# Patient Record
Sex: MALE | Employment: STUDENT | URBAN - METROPOLITAN AREA
[De-identification: names, ages, dates, MRNs, and addresses within clinical notes are randomized per-mention and may not be internally consistent; named-entity substitution may affect disease eponyms.]

---

## 2024-04-22 ENCOUNTER — TELEPHONE (OUTPATIENT)
Dept: PEDIATRICS | Facility: CLINIC | Age: 19
End: 2024-04-22
Payer: COMMERCIAL

## 2024-04-22 NOTE — TELEPHONE ENCOUNTER
PT's mom is calling, off and on for several months, Nilo has had hemorrhoids. They bleed occasionally. PT is scheduled for a WCC with you on 04/30 he has not been seen since 2022. PT has not been evaluated as of yet. Mom is aware to seek treatment if sx are severe or bleeding becomes severe.  Advised to increase fluids, eat fiber rich foods. Avoiding straining with Bms. Mom agrees with above advice.

## 2024-04-30 ENCOUNTER — OFFICE VISIT (OUTPATIENT)
Dept: PEDIATRICS | Facility: CLINIC | Age: 19
End: 2024-04-30
Payer: COMMERCIAL

## 2024-04-30 VITALS
BODY MASS INDEX: 23.38 KG/M2 | WEIGHT: 163.31 LBS | HEIGHT: 70 IN | DIASTOLIC BLOOD PRESSURE: 70 MMHG | HEART RATE: 81 BPM | OXYGEN SATURATION: 97 % | SYSTOLIC BLOOD PRESSURE: 120 MMHG

## 2024-04-30 DIAGNOSIS — K92.1: ICD-10-CM

## 2024-04-30 DIAGNOSIS — Z00.00 ROUTINE ADULT HEALTH MAINTENANCE: Primary | ICD-10-CM

## 2024-04-30 PROCEDURE — 90620 MENB-4C VACCINE IM: CPT | Performed by: PEDIATRICS

## 2024-04-30 PROCEDURE — 90472 IMMUNIZATION ADMIN EACH ADD: CPT | Performed by: PEDIATRICS

## 2024-04-30 PROCEDURE — 90715 TDAP VACCINE 7 YRS/> IM: CPT | Performed by: PEDIATRICS

## 2024-04-30 PROCEDURE — 99395 PREV VISIT EST AGE 18-39: CPT | Performed by: PEDIATRICS

## 2024-04-30 PROCEDURE — 1036F TOBACCO NON-USER: CPT | Performed by: PEDIATRICS

## 2024-04-30 PROCEDURE — 90471 IMMUNIZATION ADMIN: CPT | Performed by: PEDIATRICS

## 2024-04-30 PROCEDURE — 92551 PURE TONE HEARING TEST AIR: CPT | Performed by: PEDIATRICS

## 2024-04-30 SDOH — HEALTH STABILITY: MENTAL HEALTH: SMOKING IN HOME: 0

## 2024-04-30 ASSESSMENT — ENCOUNTER SYMPTOMS
SNORING: 0
DIARRHEA: 0
SLEEP DISTURBANCE: 0

## 2024-04-30 NOTE — PROGRESS NOTES
Subjective   History was provided by the  self .  Nilo Harden is a 18 y.o. male who is here for this well 19 yo visit. His concern is blood in his stol.  Immunization History   Administered Date(s) Administered    DTaP vaccine, pediatric  (INFANRIX) 2004, 03/01/2005, 05/04/2005, 01/13/2006, 01/20/2010    HPV 9-valent vaccine (GARDASIL 9) 01/20/2022    HPV, Quadrivalent 09/12/2016    Hepatitis B vaccine, pediatric/adolescent (RECOMBIVAX, ENGERIX) 2004, 08/31/2005    HiB PRP-OMP conjugate vaccine, pediatric (PEDVAXHIB) 03/01/2005, 05/04/2005, 01/13/2006    Hib / Hep B 2004    Influenza, injectable, quadrivalent 01/20/2022    MMR and varicella combined vaccine, subcutaneous (PROQUAD) 11/16/2006    MMR vaccine, subcutaneous (MMR II) 01/13/2006, 01/20/2010    Meningococcal ACWY vaccine (MENVEO) 01/20/2022    Meningococcal MCV4P 09/12/2016    Pneumococcal Conjugate PCV 7 2004, 03/01/2005, 05/04/2005, 06/01/2006    Poliovirus vaccine, subcutaneous (IPOL) 2004, 03/01/2005, 06/01/2006, 01/20/2010    Tdap vaccine, age 7 year and older (BOOSTRIX, ADACEL) 09/12/2016    Varicella vaccine, subcutaneous (VARIVAX) 01/13/2006, 01/20/2010     History of previous adverse reactions to immunizations? no  The following portions of the patient's history were reviewed by a provider in this encounter and updated as appropriate:  Tobacco  Allergies  Meds  Problems         Has hemorrhoids. Blood when wiping. Occ blood in stool. Using preparation H.  Started within last 3 years. Sees blood not too often. A few times a yr. Gets itchy. Blood usually when it scrapes on the way out. Has had blood swirled in loose stool.Liquidy when drinking milk or when sick or if does not eat a lot. Not nauseated and not crampy. Maybe once in a while strange pain if he oppinched belly button. [facility] no colitis or Crohns.  Well Child Assessment:  History provided by: self. Nilo lives with his mother and father.   Dental  The  "patient has a dental home.   Elimination  Elimination problems do not include diarrhea.   Sleep  Average sleep duration (hrs): 7.5. The patient does not snore. There are no sleep problems.   Safety  There is no smoking in the home. Home has working smoke alarms? yes. Home has working carbon monoxide alarms? yes. There is no gun in home.   School  School district: college freshman. There are no signs of learning disabilities.       Objective   Vitals:    04/30/24 1257   BP: 120/70   BP Location: Right arm   Pulse: 81   SpO2: 97%   Weight: 74.1 kg (163 lb 5 oz)   Height: 1.765 m (5' 9.5\")     Growth parameters are noted and are appropriate for age.  Physical Exam  Constitutional:       Appearance: Normal appearance. He is normal weight.   HENT:      Head: Normocephalic and atraumatic.      Comments: glasses     Right Ear: Tympanic membrane, ear canal and external ear normal.      Left Ear: Tympanic membrane, ear canal and external ear normal.      Nose: Nose normal.      Mouth/Throat:      Mouth: Mucous membranes are moist.      Pharynx: No oropharyngeal exudate or posterior oropharyngeal erythema.   Eyes:      Extraocular Movements: Extraocular movements intact.      Conjunctiva/sclera: Conjunctivae normal.      Pupils: Pupils are equal, round, and reactive to light.   Cardiovascular:      Rate and Rhythm: Regular rhythm. Tachycardia present.      Pulses: Normal pulses.      Heart sounds: No murmur heard.  Pulmonary:      Effort: Pulmonary effort is normal. No respiratory distress.      Breath sounds: No wheezing or rales.   Abdominal:      General: There is no distension.      Palpations: There is no mass.      Tenderness: There is no abdominal tenderness. There is no guarding or rebound.      Hernia: No hernia is present.   Genitourinary:     Penis: Normal.       Testes: Normal.      Comments: Small hemmorrhoid and superficial fissure  Musculoskeletal:         General: No swelling, tenderness, deformity or signs of " injury.      Cervical back: Normal range of motion.   Skin:     General: Skin is warm.      Capillary Refill: Capillary refill takes less than 2 seconds.      Comments: 3mm mole on upper left shoulder   Neurological:      General: No focal deficit present.      Mental Status: He is alert.      Cranial Nerves: No cranial nerve deficit.      Sensory: No sensory deficit.      Motor: No weakness.      Coordination: Coordination normal.      Gait: Gait normal.      Deep Tendon Reflexes: Reflexes normal.      Comments: Nail beds and MM's pink   Psychiatric:         Mood and Affect: Mood normal.         Behavior: Behavior normal.       Assessment/Plan   Well adolescent.  1. Anticipatory guidance discussed.  Has had ongoing blood in stool. Has a photo of many small bright red clots of blood in the toilet water.  On exam he has a small hemorrhoid approx 3/4 cm and a superficial fissure without active bleeding. There is no reported abdominal cramping, nausea, weight loss etc. Will check CBC CRP CMP Vit D. Refer to GI. Continue Compound W.  2.  Weight management:  The patient was counseled regarding nutrition and physical activity.  3. Development: appropriate for age  4. Labs as above.  Immunizations: Bexsero/Meningitis B and Tdap.  5. Follow-up visit in 1 year for next well child visit, or sooner as needed.

## 2024-05-13 ENCOUNTER — LAB (OUTPATIENT)
Dept: LAB | Facility: LAB | Age: 19
End: 2024-05-13
Payer: COMMERCIAL

## 2024-05-13 DIAGNOSIS — K92.1: ICD-10-CM

## 2024-05-13 LAB
ALBUMIN SERPL BCP-MCNC: 4.9 G/DL (ref 3.4–5)
ALP SERPL-CCNC: 71 U/L (ref 33–120)
ALT SERPL W P-5'-P-CCNC: 35 U/L (ref 10–52)
ANION GAP SERPL CALC-SCNC: 17 MMOL/L (ref 10–20)
APPEARANCE UR: CLEAR
AST SERPL W P-5'-P-CCNC: 62 U/L (ref 9–39)
BASOPHILS # BLD AUTO: 0.06 X10*3/UL (ref 0–0.1)
BASOPHILS NFR BLD AUTO: 1.1 %
BILIRUB SERPL-MCNC: 1.1 MG/DL (ref 0–1.2)
BILIRUB UR STRIP.AUTO-MCNC: NEGATIVE MG/DL
BUN SERPL-MCNC: 13 MG/DL (ref 6–23)
CALCIUM SERPL-MCNC: 9.8 MG/DL (ref 8.6–10.6)
CHLORIDE SERPL-SCNC: 100 MMOL/L (ref 98–107)
CHOLEST SERPL-MCNC: 142 MG/DL (ref 0–199)
CHOLESTEROL/HDL RATIO: 2
CO2 SERPL-SCNC: 27 MMOL/L (ref 21–32)
COLOR UR: COLORLESS
CREAT SERPL-MCNC: 1.09 MG/DL (ref 0.5–1.3)
CRP SERPL-MCNC: <0.1 MG/DL
EGFRCR SERPLBLD CKD-EPI 2021: >90 ML/MIN/1.73M*2
EOSINOPHIL # BLD AUTO: 0.15 X10*3/UL (ref 0–0.7)
EOSINOPHIL NFR BLD AUTO: 2.7 %
ERYTHROCYTE [DISTWIDTH] IN BLOOD BY AUTOMATED COUNT: 11.9 % (ref 11.5–14.5)
GLUCOSE SERPL-MCNC: 94 MG/DL (ref 74–99)
GLUCOSE UR STRIP.AUTO-MCNC: NORMAL MG/DL
HCT VFR BLD AUTO: 42 % (ref 41–52)
HDLC SERPL-MCNC: 72.5 MG/DL
HGB BLD-MCNC: 13.4 G/DL (ref 13.5–17.5)
IMM GRANULOCYTES # BLD AUTO: 0.01 X10*3/UL (ref 0–0.7)
IMM GRANULOCYTES NFR BLD AUTO: 0.2 % (ref 0–0.9)
KETONES UR STRIP.AUTO-MCNC: NEGATIVE MG/DL
LDLC SERPL CALC-MCNC: 60 MG/DL
LEUKOCYTE ESTERASE UR QL STRIP.AUTO: NEGATIVE
LYMPHOCYTES # BLD AUTO: 1.06 X10*3/UL (ref 1.2–4.8)
LYMPHOCYTES NFR BLD AUTO: 19.3 %
MCH RBC QN AUTO: 29.1 PG (ref 26–34)
MCHC RBC AUTO-ENTMCNC: 31.9 G/DL (ref 32–36)
MCV RBC AUTO: 91 FL (ref 80–100)
MONOCYTES # BLD AUTO: 0.57 X10*3/UL (ref 0.1–1)
MONOCYTES NFR BLD AUTO: 10.4 %
NEUTROPHILS # BLD AUTO: 3.64 X10*3/UL (ref 1.2–7.7)
NEUTROPHILS NFR BLD AUTO: 66.3 %
NITRITE UR QL STRIP.AUTO: NEGATIVE
NON HDL CHOLESTEROL: 70 MG/DL (ref 0–119)
NRBC BLD-RTO: 0 /100 WBCS (ref 0–0)
PH UR STRIP.AUTO: 7 [PH]
PLATELET # BLD AUTO: 425 X10*3/UL (ref 150–450)
POTASSIUM SERPL-SCNC: 4.4 MMOL/L (ref 3.5–5.3)
PROT SERPL-MCNC: 7.6 G/DL (ref 6.4–8.2)
PROT UR STRIP.AUTO-MCNC: NEGATIVE MG/DL
RBC # BLD AUTO: 4.6 X10*6/UL (ref 4.5–5.9)
RBC # UR STRIP.AUTO: NEGATIVE /UL
SODIUM SERPL-SCNC: 140 MMOL/L (ref 136–145)
SP GR UR STRIP.AUTO: 1.01
TRIGL SERPL-MCNC: 47 MG/DL (ref 0–149)
UROBILINOGEN UR STRIP.AUTO-MCNC: NORMAL MG/DL
VLDL: 9 MG/DL (ref 0–40)
WBC # BLD AUTO: 5.5 X10*3/UL (ref 4.4–11.3)

## 2024-05-13 PROCEDURE — 81003 URINALYSIS AUTO W/O SCOPE: CPT

## 2024-05-13 PROCEDURE — 86003 ALLG SPEC IGE CRUDE XTRC EA: CPT

## 2024-05-13 PROCEDURE — 83516 IMMUNOASSAY NONANTIBODY: CPT

## 2024-05-13 PROCEDURE — 80053 COMPREHEN METABOLIC PANEL: CPT

## 2024-05-13 PROCEDURE — 36415 COLL VENOUS BLD VENIPUNCTURE: CPT

## 2024-05-13 PROCEDURE — 85025 COMPLETE CBC W/AUTO DIFF WBC: CPT

## 2024-05-13 PROCEDURE — 80061 LIPID PANEL: CPT

## 2024-05-13 PROCEDURE — 86140 C-REACTIVE PROTEIN: CPT

## 2024-05-14 LAB
GLIADIN PEPTIDE IGA SER IA-ACNC: <1 U/ML
TTG IGA SER IA-ACNC: <1 U/ML

## 2024-05-15 LAB
CLAM IGE QN: <0.1 KU/L
CODFISH IGE QN: <0.1 KU/L
CORN IGE QN: <0.1
EGG WHITE IGE QN: <0.1 KU/L
GLIADIN PEPTIDE IGG SER IA-ACNC: <0.56 FLU (ref 0–4.99)
MILK IGE QN: <0.1 KU/L
PEANUT IGE QN: <0.1 KU/L
SCALLOP IGE QN: <0.1 KU/L
SESAME SEED IGE QN: <0.1 KU/L
SHRIMP IGE QN: <0.1 KU/L
SOYBEAN IGE QN: <0.1 KU/L
TTG IGG SER IA-ACNC: <0.82 FLU (ref 0–4.99)
WALNUT IGE QN: <0.1 KU/L
WHEAT IGE QN: <0.1 KU/L

## 2024-05-17 ENCOUNTER — TELEPHONE (OUTPATIENT)
Dept: PEDIATRICS | Facility: CLINIC | Age: 19
End: 2024-05-17
Payer: COMMERCIAL

## 2024-05-17 DIAGNOSIS — R74.8 INCREASED LIVER ENZYMES: Primary | ICD-10-CM

## 2024-05-17 NOTE — TELEPHONE ENCOUNTER
Nilo is returning your call in regards to lab work. He says he will be available around 5:00p.m. or after today.       [phone]

## 2024-07-01 ENCOUNTER — OFFICE VISIT (OUTPATIENT)
Dept: GASTROENTEROLOGY | Facility: HOSPITAL | Age: 19
End: 2024-07-01
Payer: COMMERCIAL

## 2024-07-01 VITALS
HEART RATE: 54 BPM | SYSTOLIC BLOOD PRESSURE: 114 MMHG | TEMPERATURE: 98.1 F | OXYGEN SATURATION: 97 % | DIASTOLIC BLOOD PRESSURE: 72 MMHG | BODY MASS INDEX: 24.53 KG/M2 | WEIGHT: 168.5 LBS

## 2024-07-01 DIAGNOSIS — Z87.19 HISTORY OF RECTAL BLEEDING: ICD-10-CM

## 2024-07-01 DIAGNOSIS — K59.09 INTERMITTENT CONSTIPATION: Primary | ICD-10-CM

## 2024-07-01 DIAGNOSIS — K92.1: ICD-10-CM

## 2024-07-01 PROCEDURE — 1036F TOBACCO NON-USER: CPT | Performed by: PHYSICIAN ASSISTANT

## 2024-07-01 PROCEDURE — 99203 OFFICE O/P NEW LOW 30 MIN: CPT | Performed by: PHYSICIAN ASSISTANT

## 2024-07-01 PROCEDURE — 99213 OFFICE O/P EST LOW 20 MIN: CPT | Performed by: PHYSICIAN ASSISTANT

## 2024-07-01 SDOH — ECONOMIC STABILITY: FOOD INSECURITY: WITHIN THE PAST 12 MONTHS, YOU WORRIED THAT YOUR FOOD WOULD RUN OUT BEFORE YOU GOT MONEY TO BUY MORE.: NEVER TRUE

## 2024-07-01 SDOH — ECONOMIC STABILITY: FOOD INSECURITY: WITHIN THE PAST 12 MONTHS, THE FOOD YOU BOUGHT JUST DIDN'T LAST AND YOU DIDN'T HAVE MONEY TO GET MORE.: NEVER TRUE

## 2024-07-01 ASSESSMENT — ENCOUNTER SYMPTOMS
UNEXPECTED WEIGHT CHANGE: 0
SORE THROAT: 0
NAUSEA: 0
ARTHRALGIAS: 0
EYE REDNESS: 0
APPETITE CHANGE: 0
COUGH: 0
SHORTNESS OF BREATH: 0
CONSTIPATION: 1
ANAL BLEEDING: 1
BLOOD IN STOOL: 1
DIARRHEA: 0
VOMITING: 0
CHOKING: 0
ABDOMINAL PAIN: 0
HEMATURIA: 0
TROUBLE SWALLOWING: 0

## 2024-07-01 ASSESSMENT — PATIENT HEALTH QUESTIONNAIRE - PHQ9
1. LITTLE INTEREST OR PLEASURE IN DOING THINGS: NOT AT ALL
2. FEELING DOWN, DEPRESSED OR HOPELESS: NOT AT ALL
SUM OF ALL RESPONSES TO PHQ9 QUESTIONS 1 & 2: 0

## 2024-07-01 ASSESSMENT — LIFESTYLE VARIABLES
HOW OFTEN DO YOU HAVE SIX OR MORE DRINKS ON ONE OCCASION: NEVER
HOW MANY STANDARD DRINKS CONTAINING ALCOHOL DO YOU HAVE ON A TYPICAL DAY: PATIENT DOES NOT DRINK

## 2024-07-01 ASSESSMENT — PAIN SCALES - GENERAL: PAINLEVEL: 0-NO PAIN

## 2024-07-01 NOTE — PROGRESS NOTES
"History Of Present Illness  Nilo Haredn is a 18 y.o. male with no significant PMH is here for blood in the stool and referred to GI by his PCP (Dr. Stubbs).    Pt did see Dr. Stubbs regarding his complaint of blood in stool back in April 2024 and on her exam, he was found to have a non bleeding hemorrhoid and a superficial fissure (no active bleeding). He would use Preparation H at times.  The rectal bleeding off/on for past 3 yrs and pt has a hemorrhoid that does get irritated at times.  But then recently, pt has seen \"blood mixed with the stool\" in past 6 months.  Pt admits to occasional constipation and he admits to seeing more blood during these times.  He admits to 4-5 episodes of hematochezia the past 6 months with some mild rectal irritation and last episode was approx 3 months ago. He has been eating healthier and having normal Bms and have not seen anymore blood in the stool since then.  Pt denies any n/v, abd pain, unintentional weight loss or any extra intestinal manifestations.    No previous GI workup done.  No family h/o IBD.    Labs including CBC, CRP and CMP were ordered and completed on May 2024.  All were normal except for mildly elevated AST = 62.  Celiac abs and Food allergy profile IgE were collected as well on May 2024 and normal.       Past Medical History  None  Surgical History  Broke left arm (at age 6) - repair with pins     Social History  He reports that he has never smoked. He has been exposed to tobacco smoke. He has never used smokeless tobacco. He reports that he does not currently use alcohol. He reports that he does not use drugs.    Family History  Family History   Problem Relation Name Age of Onset    Diabetes type II Father      Other (vision problems) Other      Hypertension Other      Other (allergic to cats) Other     No family h/o IBD or GI cancers     Allergies  Patient has no known allergies.      Review of Systems   Constitutional:  Negative for appetite change and " unexpected weight change.   HENT:  Negative for sore throat and trouble swallowing.    Eyes:  Negative for redness.   Respiratory:  Negative for cough, choking and shortness of breath.    Cardiovascular:  Negative for chest pain.   Gastrointestinal:  Positive for anal bleeding (intermittent. no recent episodes), blood in stool (intermittent. has not had any episodes recently) and constipation (intermittently). Negative for abdominal pain, diarrhea, nausea and vomiting.   Genitourinary:  Negative for hematuria.   Musculoskeletal:  Negative for arthralgias.   Skin:  Negative for rash.          /72   Pulse 54   Temp 36.7 °C (98.1 °F)   Wt 76.4 kg (168 lb 8 oz)   SpO2 97%   BMI 24.53 kg/m²   Physical Exam  Vitals reviewed.   Constitutional:       General: He is not in acute distress.     Appearance: Normal appearance. He is normal weight. He is not ill-appearing.   HENT:      Head: Normocephalic and atraumatic.      Mouth/Throat:      Mouth: Mucous membranes are moist.      Pharynx: Oropharynx is clear. No oropharyngeal exudate.   Eyes:      General: No scleral icterus.     Pupils: Pupils are equal, round, and reactive to light.   Cardiovascular:      Rate and Rhythm: Normal rate and regular rhythm.      Heart sounds: Normal heart sounds.   Pulmonary:      Effort: Pulmonary effort is normal. No respiratory distress.      Breath sounds: Normal breath sounds. No wheezing or rhonchi.   Abdominal:      General: Bowel sounds are normal. There is no distension.      Palpations: Abdomen is soft.      Tenderness: There is no abdominal tenderness. There is no guarding or rebound.   Musculoskeletal:         General: No swelling or deformity.      Cervical back: Neck supple.   Lymphadenopathy:      Cervical: No cervical adenopathy.   Skin:     General: Skin is warm.      Coloration: Skin is not jaundiced.      Findings: No rash.   Neurological:      General: No focal deficit present.      Mental Status: He is alert.  Mental status is at baseline.   Psychiatric:         Mood and Affect: Mood normal.         Behavior: Behavior normal.         Thought Content: Thought content normal.         Relevant Results      Assessment/Plan:      1) Intermittent episodes of rectal bleeding and hematochezia -  Based on history and presentation, it does appear that this could be from rectal outlet source, exacerbated by intermittent episodes of constipation.  Will start pt on a daily regimen of fiber supplementation (I.e Metamucil) and encouraged pt to drink plenty of fluids and can add a stool softner if needed.  We will check a fecal calprotectin to assess for any inflammation in the colon.   If result is significantly elevated and/or his sx persist despite above plan, then we will likely proceed with colonoscopy to further assess.    Pt to follow up in 2-3 months.    Daxa Sosa PA-C

## 2024-07-01 NOTE — PATIENT INSTRUCTIONS
Buy Metamucil (powder or tablets). Start with taking a daily dose with a lot of water for   2 wks. If tolerating, then increase your dose to twice daily. Do not take in the evening.    If you feel like the stool is harder, then you can also add a stool softner called Colace.    Collect stool for the test and I will get back to you once results are in.  Sign up for my chart